# Patient Record
Sex: MALE | Race: BLACK OR AFRICAN AMERICAN
[De-identification: names, ages, dates, MRNs, and addresses within clinical notes are randomized per-mention and may not be internally consistent; named-entity substitution may affect disease eponyms.]

---

## 2021-09-15 ENCOUNTER — APPOINTMENT (OUTPATIENT)
Dept: UROLOGY | Facility: CLINIC | Age: 31
End: 2021-09-15

## 2021-09-20 ENCOUNTER — APPOINTMENT (OUTPATIENT)
Dept: UROLOGY | Facility: CLINIC | Age: 31
End: 2021-09-20
Payer: COMMERCIAL

## 2021-09-20 VITALS
OXYGEN SATURATION: 98 % | DIASTOLIC BLOOD PRESSURE: 89 MMHG | RESPIRATION RATE: 14 BRPM | HEART RATE: 99 BPM | SYSTOLIC BLOOD PRESSURE: 156 MMHG | HEIGHT: 69 IN | WEIGHT: 190 LBS | TEMPERATURE: 97.8 F | BODY MASS INDEX: 28.14 KG/M2

## 2021-09-20 DIAGNOSIS — Z80.3 FAMILY HISTORY OF MALIGNANT NEOPLASM OF BREAST: ICD-10-CM

## 2021-09-20 DIAGNOSIS — Z80.42 FAMILY HISTORY OF MALIGNANT NEOPLASM OF PROSTATE: ICD-10-CM

## 2021-09-20 DIAGNOSIS — N50.0 ATROPHY OF TESTIS: ICD-10-CM

## 2021-09-20 PROCEDURE — 99204 OFFICE O/P NEW MOD 45 MIN: CPT

## 2021-09-20 NOTE — PHYSICAL EXAM
[General Appearance - Well Developed] : well developed [General Appearance - Well Nourished] : well nourished [Normal Appearance] : normal appearance [Well Groomed] : well groomed [General Appearance - In No Acute Distress] : no acute distress [Edema] : no peripheral edema [Respiration, Rhythm And Depth] : normal respiratory rhythm and effort [Exaggerated Use Of Accessory Muscles For Inspiration] : no accessory muscle use [Abdomen Soft] : soft [Abdomen Tenderness] : non-tender [Costovertebral Angle Tenderness] : no ~M costovertebral angle tenderness [Urethral Meatus] : meatus normal [Penis Abnormality] : normal uncircumcised penis [Urinary Bladder Findings] : the bladder was normal on palpation [Scrotum] : the scrotum was normal [Testes Tenderness] : no tenderness of the testes [Testes Mass (___cm)] : there were no testicular masses [FreeTextEntry1] : No urethral discharge, soft atrophic 1 to 2 cm testes bilaterally, bilateral grade 1 through 2 varicoceles [Normal Station and Gait] : the gait and station were normal for the patient's age [] : no rash [No Focal Deficits] : no focal deficits [Oriented To Time, Place, And Person] : oriented to person, place, and time [Affect] : the affect was normal [Mood] : the mood was normal [Not Anxious] : not anxious [Inguinal Lymph Nodes Enlarged Bilaterally] : inguinal

## 2021-09-20 NOTE — HISTORY OF PRESENT ILLNESS
[FreeTextEntry1] : He is a 30-year-old man who is seen today for initial visit.  Patient states that in the last 2 months every now and then when he has a bowel movement he notices a yellowish discharge from the tip of the penis.  There are no other symptoms.  There is no dysuria.  There is no fever.  He has not been sexually active for a while.  He generally does not have significant urinary symptoms or erectile dysfunction.  Gonorrhea, chlamydia, syphilis screen were normal.  Urinalysis showed protein and 3-10 red blood cells.  He is a non-smoker.  He has noticed that for a long time he has small testes.  He has no children.  Residual urine today was minimal.

## 2021-09-20 NOTE — ASSESSMENT
[FreeTextEntry1] : No urethral discharge was noted.  STD testing was normal.  Sometimes pressure on the prostate such as rectal examination or hard bowel movements could express some seminal fluid which is not uncommon.  He was reassured.  He has not been sexually active.  He has small atrophic testes but no high-grade varicoceles.  Testosterone, LH, FSH and prolactin levels will be done.  He has not tried to conceive.  Therefore, his fertility status is unknown.  Also urinalysis showed 3-10 red blood cells.  He is a non-smoker.  Urinalysis will be repeated and then we will discuss the results on the phone.  Causes and work-up of microscopic hematuria were discussed.\par \par Oscar Cesar MD, FACS\par [facility]\par  of Urology\par \par [address], [address]\[place] [address]\par \par [address], [address]\[place] [address]\par \par Tel: [phone]\par Fax: [phone]

## 2021-09-21 LAB
APPEARANCE: CLEAR
BACTERIA: NEGATIVE
BILIRUBIN URINE: NEGATIVE
BLOOD URINE: NEGATIVE
CALCIUM OXALATE CRYSTALS: ABNORMAL
COLOR: YELLOW
GLUCOSE QUALITATIVE U: NEGATIVE
HYALINE CASTS: 0 /LPF
KETONES URINE: NORMAL
LEUKOCYTE ESTERASE URINE: NEGATIVE
MICROSCOPIC-UA: NORMAL
NITRITE URINE: NEGATIVE
PH URINE: 6
PROLACTIN SERPL-MCNC: 10.5 NG/ML
PROTEIN URINE: ABNORMAL
RED BLOOD CELLS URINE: 3 /HPF
SPECIFIC GRAVITY URINE: 1.03
SQUAMOUS EPITHELIAL CELLS: 1 /HPF
TESTOST SERPL-MCNC: 369 NG/DL
UROBILINOGEN URINE: NORMAL
WHITE BLOOD CELLS URINE: 2 /HPF

## 2021-09-22 LAB
FSH SERPL-MCNC: 18.6 IU/L
LH SERPL-ACNC: 14.5 IU/L

## 2021-09-28 DIAGNOSIS — R31.21 ASYMPTOMATIC MICROSCOPIC HEMATURIA: ICD-10-CM

## 2021-09-28 LAB — BACTERIA UR CULT: ABNORMAL

## 2021-09-28 RX ORDER — NITROFURANTOIN MACROCRYSTALS 100 MG/1
100 CAPSULE ORAL
Qty: 14 | Refills: 0 | Status: ACTIVE | COMMUNITY
Start: 2021-09-28 | End: 1900-01-01

## 2021-10-26 DIAGNOSIS — R36.9 URETHRAL DISCHARGE, UNSPECIFIED: ICD-10-CM

## 2021-10-31 LAB — BACTERIA UR CULT: NORMAL

## 2021-11-01 ENCOUNTER — NON-APPOINTMENT (OUTPATIENT)
Age: 31
End: 2021-11-01

## 2021-11-04 ENCOUNTER — APPOINTMENT (OUTPATIENT)
Dept: UROLOGY | Facility: CLINIC | Age: 31
End: 2021-11-04

## 2025-08-01 ENCOUNTER — EMERGENCY (EMERGENCY)
Facility: HOSPITAL | Age: 35
LOS: 0 days | Discharge: HOME | End: 2025-08-01
Attending: STUDENT IN AN ORGANIZED HEALTH CARE EDUCATION/TRAINING PROGRAM
Payer: COMMERCIAL

## 2025-08-01 ENCOUNTER — TRANSCRIPTION ENCOUNTER (OUTPATIENT)
Age: 35
End: 2025-08-01

## 2025-08-01 VITALS
SYSTOLIC BLOOD PRESSURE: 147 MMHG | OXYGEN SATURATION: 98 % | HEART RATE: 97 BPM | TEMPERATURE: 98 F | DIASTOLIC BLOOD PRESSURE: 87 MMHG | RESPIRATION RATE: 18 BRPM

## 2025-08-01 VITALS
SYSTOLIC BLOOD PRESSURE: 170 MMHG | TEMPERATURE: 98 F | HEIGHT: 69 IN | RESPIRATION RATE: 18 BRPM | OXYGEN SATURATION: 100 % | DIASTOLIC BLOOD PRESSURE: 93 MMHG | WEIGHT: 184.97 LBS | HEART RATE: 99 BPM

## 2025-08-01 DIAGNOSIS — R21 RASH AND OTHER NONSPECIFIC SKIN ERUPTION: ICD-10-CM

## 2025-08-01 LAB
HSV1 IGG SER-ACNC: 0.14 INDEX — SIGNIFICANT CHANGE UP
HSV1 IGG SERPL QL IA: NEGATIVE — SIGNIFICANT CHANGE UP
HSV2 IGG FLD-ACNC: 6.36 INDEX — HIGH
HSV2 IGG SERPL QL IA: POSITIVE

## 2025-08-01 PROCEDURE — 99284 EMERGENCY DEPT VISIT MOD MDM: CPT

## 2025-08-01 NOTE — ED PROVIDER NOTE - IV ALTEPLASE DOOR HIDDEN
show Complex Repair And Flap Additional Text (Will Appearing After The Standard Complex Repair Text): The complex repair was not sufficient to completely close the primary defect. The remaining additional defect was repaired with the flap mentioned below.

## 2025-08-01 NOTE — ED ADULT NURSE NOTE - OBJECTIVE STATEMENT
Patient presents to ED for evaluation of rash noted to right groin area noticed today s/p feeling a bite to the area yesterday. Patient reports recent findings of fire ants in his home. Denies chills, fever or  symptoms.

## 2025-08-01 NOTE — ED PROVIDER NOTE - CLINICAL SUMMARY MEDICAL DECISION MAKING FREE TEXT BOX
35 y/o M who denies PMH here today c/o rash to the penis. Patient states he felt like he was bit by a bug on the penis yesterday and did see bugs in his house. States the area is itching. He has been sexually active but not in 3 months. No penile discharge. No fever, chills, N/V, or other lesions noted.    In the ED, vitals stable.    GENERAL: Awake, alert, NAD  HEENT: NC/AT, moist mucous membranes  LUNGS: CTAB, no wheezes or crackles   CARDIAC: RRR, no m/r/g  : small vesicular lesions vs bites noted to the foreskin  EXT: No edema, no deformities.  NEURO: A&Ox3. Moving all extremities.  SKIN: Warm and dry. No rash.  PSYCH: Normal affect.    Exam as above.  ?herpes infection, possible bug bite but unusual location  Will send HSV testing  patient declines treatment until test results.

## 2025-08-01 NOTE — ED ADULT NURSE NOTE - ED STAT RN HANDOFF DETAILS
Patient verbalized understanding D/C instructions to: Await lab results, & return for sudden or worsening symptoms.

## 2025-08-01 NOTE — ED PROVIDER NOTE - NSFOLLOWUPINSTRUCTIONS_ED_ALL_ED_FT
Follow up for test results.    Your blood pressure was elevated int  ED. Follow up with your primary doctor to trend.    Rash    A rash is a change in the color of the skin. A rash can also change the way your skin feels. There are many different conditions and factors that can cause a rash, most of which are not dangerous. Make sure to follow up with your primary care physician or a dermatologist as instructed by your health care provider.    SEEK IMMEDIATE MEDICAL CARE IF YOU HAVE ANY OF THE FOLLOWING SYMPTOMS: fever, blisters, a rash inside your mouth, vaginal or anal pain, or altered mental status.

## 2025-08-01 NOTE — ED PROVIDER NOTE - PATIENT PORTAL LINK FT
You can access the FollowMyHealth Patient Portal offered by [facility] by registering at the following website: [email or web address]. By joining [facility]’s FollowMyHealth portal, you will also be able to view your health information using other applications (apps) compatible with our system.

## 2025-08-01 NOTE — ED ADULT TRIAGE NOTE - ARRIVAL FROM
Anesthesia Evaluation     Patient summary reviewed   no history of anesthetic complications:   NPO Solid Status: > 8 hours             Airway   Mallampati: II  Dental    (+) partials    Pulmonary    (+) ,home oxygen  (-) COPD, asthma, sleep apnea, not a smoker  Cardiovascular     (+) hypertension, CAD, CABG (2020), hyperlipidemia  (-) pacemaker, past MI, angina, cardiac stents      Neuro/Psych  (+) CVA  (-) seizures, TIA  GI/Hepatic/Renal/Endo    (+) renal disease- CRI  (-) GERD, liver disease, diabetes    Musculoskeletal     Abdominal    Substance History      OB/GYN          Other                    Anesthesia Plan    ASA 3     MAC       Anesthetic plan, risks, benefits, and alternatives have been provided, discussed and informed consent has been obtained with: patient.    CODE STATUS:    Level Of Support Discussed With: Patient  Code Status (Patient has no pulse and is not breathing): CPR (Attempt to Resuscitate)  Medical Interventions (Patient has pulse or is breathing): Full Support      
Home